# Patient Record
(demographics unavailable — no encounter records)

---

## 2021-01-20 NOTE — REP
INDICATION:

ANATOMY



COMPARISON:

None.



TECHNIQUE:

Transabdominal obstetrical ultrasound with color Doppler evaluation.



FINDINGS:

Examination demonstrates a single live intrauterine pregnancy in cephalic

presentation.  Fetal motion is identified by technologist.  Placenta is noted

posterior and  grade 1 without evidence for placenta previa or abruption.  Amniotic

fluid volume is normal.  Cervix measures 4.1 cm in length and appears closed..



Gestational age by current measurements 20 weeks 1 day with MK 06/08/2021.



FHR equals 135 beats per minute.



BPD:      4.8 cm 20 weeks 4 days

HC:         17.6 cm 20 weeks 1 day

AC:         15.3 cm 20 weeks 3 days

FL:          3.1 cm 19 weeks 5 days

HL:          3.1 cm 20 weeks 2 days

HC/AC: 1.15

Estimated fetal weight 334 grams (41stpercentile).



Anatomical assessment demonstrates normal structures including cranium, choroid

plexus, cavum, cerebellum/posterior fossa, facial features, lungs, four-chamber

heart/ventricular outflow tracts, diaphragm, stomach, cord insertion/three-vessel

cord, kidneys/bladder, spine, and extremities.



IMPRESSION:

Single live intrauterine pregnancy in cephalic presentation demonstrating appropriate

estimated fetal weight.  Anatomical assessment is complete and normal.





<Electronically signed by Marlon Rodriguez > 01/20/21 [id number]